# Patient Record
(demographics unavailable — no encounter records)

---

## 2025-05-14 NOTE — DISCUSSION/SUMMARY
[FreeTextEntry1] : 35-year-old -0-2-0 last menstrual cycle was May 4, 2025 for 3 days presents for GYN evaluation.  She is sexually active and uses condoms.  Physical examination reveals evidence of bacterial vaginosis and cervical polyp.  [facility] prescribed and patient to return in 4 weeks for cervical polypectomy and hysteroscopy.  All questions answered.

## 2025-05-14 NOTE — HISTORY OF PRESENT ILLNESS
[Condoms] : uses condoms [Y] : Positive pregnancy history [FreeTextEntry1] : 35-year-old -0-2-0 last menstrual cycle was May 4, 2025 for 3 days presents for GYN evaluation.  She is sexually active and uses condoms.  Complains of occasional vaginal discharge. [PGHxTotal] : 2 [PGHxFullTerm] : 0 [PGHxPremature] : 0 [PGHxAbortions] : 2 [[facility]] : 0 [PGHxABInduced] : 0 [PGHxABSpont] : 2 [PGHxEctopic] : 0 [PGHxMultBirths] : 0

## 2025-05-14 NOTE — PHYSICAL EXAM
[Chaperoned Physical Exam] : A chaperone was present in the examining room during all aspects of the physical examination. [MA] : MA [Appropriately responsive] : appropriately responsive [Alert] : alert [No Acute Distress] : no acute distress [No Lymphadenopathy] : no lymphadenopathy [Regular Rate Rhythm] : regular rate rhythm [No Murmurs] : no murmurs [Clear to Auscultation B/L] : clear to auscultation bilaterally [Soft] : soft [Non-tender] : non-tender [Non-distended] : non-distended [No HSM] : No HSM [No Lesions] : no lesions [No Mass] : no mass [Oriented x3] : oriented x3 [Examination Of The Breasts] : a normal appearance [No Masses] : no breast masses were palpable [Labia Majora] : normal [Labia Minora] : normal [Discharge] : a  ~M vaginal discharge was present [Moderate] : moderate [Jeanna] : yellow [Frothy] : frothy [Mucopurulent] : mucopurulent [Polyp ___ cm] : [unfilled] ~[facility] polyp [Normal] : normal [Uterine Adnexae] : normal [Declined] : Patient declined rectal exam [FreeTextEntry2] : stephen

## 2025-07-14 NOTE — PLAN
[FreeTextEntry1] : 35-year-old -0-2-0 status post colposcopy and polypectomy and pathology is benign.  Patient to return in 3 months for follow-up.  All questions answered.

## 2025-07-14 NOTE — HISTORY OF PRESENT ILLNESS
[FreeTextEntry1] : 35-year-old -0-2-0 status post endometrial sampling and polypectomy and pathology is benign.  She complains of postcoital bleeding 2 weeks ago which has not recurred.  She is sexually active and uses condoms.